# Patient Record
Sex: FEMALE | Race: WHITE | NOT HISPANIC OR LATINO | Employment: OTHER | URBAN - METROPOLITAN AREA
[De-identification: names, ages, dates, MRNs, and addresses within clinical notes are randomized per-mention and may not be internally consistent; named-entity substitution may affect disease eponyms.]

---

## 2023-02-28 ENCOUNTER — OFFICE VISIT (OUTPATIENT)
Dept: URGENT CARE | Facility: CLINIC | Age: 88
End: 2023-02-28
Payer: MEDICARE

## 2023-02-28 VITALS
DIASTOLIC BLOOD PRESSURE: 79 MMHG | BODY MASS INDEX: 21.79 KG/M2 | HEIGHT: 63 IN | HEART RATE: 68 BPM | WEIGHT: 123 LBS | RESPIRATION RATE: 14 BRPM | TEMPERATURE: 99 F | SYSTOLIC BLOOD PRESSURE: 202 MMHG | OXYGEN SATURATION: 95 %

## 2023-02-28 DIAGNOSIS — I16.9 HYPERTENSIVE CRISIS: Primary | ICD-10-CM

## 2023-02-28 DIAGNOSIS — H61.23 BILATERAL IMPACTED CERUMEN: ICD-10-CM

## 2023-02-28 PROCEDURE — 99213 OFFICE O/P EST LOW 20 MIN: CPT | Mod: S$GLB,,,

## 2023-02-28 PROCEDURE — 1159F MED LIST DOCD IN RCRD: CPT | Mod: CPTII,S$GLB,,

## 2023-02-28 PROCEDURE — 1126F AMNT PAIN NOTED NONE PRSNT: CPT | Mod: CPTII,S$GLB,,

## 2023-02-28 PROCEDURE — 99213 PR OFFICE/OUTPT VISIT, EST, LEVL III, 20-29 MIN: ICD-10-PCS | Mod: S$GLB,,,

## 2023-02-28 PROCEDURE — 1160F RVW MEDS BY RX/DR IN RCRD: CPT | Mod: CPTII,S$GLB,,

## 2023-02-28 PROCEDURE — 1159F PR MEDICATION LIST DOCUMENTED IN MEDICAL RECORD: ICD-10-PCS | Mod: CPTII,S$GLB,,

## 2023-02-28 PROCEDURE — 1126F PR PAIN SEVERITY QUANTIFIED, NO PAIN PRESENT: ICD-10-PCS | Mod: CPTII,S$GLB,,

## 2023-02-28 PROCEDURE — 1160F PR REVIEW ALL MEDS BY PRESCRIBER/CLIN PHARMACIST DOCUMENTED: ICD-10-PCS | Mod: CPTII,S$GLB,,

## 2023-02-28 RX ORDER — PROPRANOLOL HYDROCHLORIDE 60 MG/1
60 CAPSULE, EXTENDED RELEASE ORAL
COMMUNITY
Start: 2022-12-10

## 2023-02-28 NOTE — PATIENT INSTRUCTIONS
Make an urgent appointment with your primary care doctor to talk about your blood pressure.     If you develop any new chest pain, shortness of breath, dizziness, vision changes, back pain, or headache go to the ER immediately.    Elevated Blood Pressure Reading  Please be aware your blood pressure was slightly elevated today -  Make sure to take your blood pressure medicines, eat a low salt diet and recheck your blood pressure to make sure it is not getting too elevated ( greater than 160/100).  Also make sure to let your doctor know about the elevated reading.  You should recheck your blood pressure twice a day for the next 2 weeks while follow up with your PCP at your next visit regarding today's reading.  If you have any chest pain, shortness or breath, palpitations, headache, visual disturbances, ect, you should go to the ER.    In the meantime, we recommend you schedule an appointment with your PCP in the next 2-3 days for a recheck of your blood pressure.     - Here are a few generalized recommended lifestyle modifications proven to lower BPs--DASH diet, exercise, weight loss, reducing stress.  *Of note: above recommendations are generalized conservative lifestyle modifications that include but are not limited to above list.   Please do not attempt any of the above recommendations if they do not pertain to you or should cause overall detriment to your health.   Please call the clinic or your PCP with any questions you may have in regards to BP and lifestyle modifications.    Follow these steps when taking your blood pressure to ensure an accurate reading.    1. Be still - ensure at least 5 minutes of quiet rest before measurements. Do not talk or listen while taking the reading.  2. Don't smoke, drink caffeinated beverages or exercise within 30 minutes before measuring your blood pressure. Empty your bladder.  3. Sit correctly - sit with your back straight and supported (on a dining chair, rather than a  sofa). Your feet should be flat on the floor and your legs should not be crossed. Your arm should be supported on a flat surface (such as a table) with the upper arm at heart level. Make sure the bottom of the cuff is placed directly above the bend of the elbow.   4. Measure at the different times of the day, at least 45 minutes after your medications. It is best to take the readings daily.  5. Dont take the measurement over clothes.        - You must understand that you have received an Urgent Care treatment only and that you may be released before all of your medical problems are known or treated.   - You, the patient, will arrange for follow up care as instructed with your primary care provider or recommended specialist.   - If your condition worsens or fails to improve we recommend that you receive another evaluation at the ER immediately or contact your PCP to discuss your concerns, or return here.   - Please do not drive or make any important decisions for 24 hours if you have received any pain medications, sedatives or mood altering drugs during your visit.    Disclaimer: This document was drafted with the use of a voice recognition device and is likely to have sound alike errors.

## 2023-02-28 NOTE — PROGRESS NOTES
"Subjective:       Patient ID: Saida Richard is a 89 y.o. female.    Vitals:  height is 5' 3" (1.6 m) and weight is 55.8 kg (123 lb). Her oral temperature is 98.5 °F (36.9 °C). Her blood pressure is 202/79 (abnormal) and her pulse is 68. Her respiration is 14 and oxygen saturation is 95%.     Chief Complaint: Ear Fullness    Patient has a referral for hearing aides. Patient told her ears were clogged by audiologist and told she needed to get them cleaned before getting hearing aids.    Of note patient's BP is elevated today in clinic. Based on recent visits, her systolic BP is always elevated. She took her beta blocker this morning. Denies any chest pain, shortness of breath, dizziness, n/v, weakness, increased trouble speaking. Unable to notify PCP due to office closed and not available on secure chat. NKDA.    Ear Fullness   There is pain in both ears. This is a new problem. The current episode started 1 to 4 weeks ago. The problem occurs constantly. The problem has been gradually worsening. There has been no fever. The pain is at a severity of 0/10. The patient is experiencing no pain. Associated symptoms include hearing loss. Pertinent negatives include no abdominal pain, coughing, diarrhea, ear discharge, headaches, neck pain, rash, rhinorrhea, sore throat or vomiting. She has tried nothing for the symptoms. Her past medical history is significant for hearing loss. There is no history of a chronic ear infection or a tympanostomy tube.   Constitution: Negative for chills and fever.   HENT:  Positive for hearing loss. Negative for ear pain, ear discharge, congestion, sinus pain, sinus pressure, sore throat, trouble swallowing and voice change.    Neck: Negative for neck pain.   Cardiovascular:  Negative for chest pain.   Eyes:  Negative for eye discharge, eye pain, photophobia, vision loss, double vision and blurred vision.   Respiratory:  Negative for cough and shortness of breath.    Gastrointestinal:  Negative " for abdominal pain, vomiting and diarrhea.   Genitourinary:  Negative for flank pain.   Musculoskeletal:  Negative for back pain.   Skin:  Negative for rash.   Neurological:  Negative for dizziness, light-headedness, passing out, headaches, disorientation, loss of consciousness, numbness and tingling.   Psychiatric/Behavioral:  Negative for disorientation and confusion.      Objective:      Vitals:    02/28/23 1621   BP: (!) 202/79   Pulse: 68   Resp: 14   Temp: 98.5 °F (36.9 °C)       Physical Exam   Constitutional: She is oriented to person, place, and time. She appears well-developed. She is cooperative.  Non-toxic appearance. She does not appear ill. No distress.   HENT:   Head: Normocephalic and atraumatic.   Ears:   Right Ear: Hearing, tympanic membrane, external ear and ear canal normal. impacted cerumen  Left Ear: Hearing, tympanic membrane, external ear and ear canal normal. impacted cerumen  Nose: Nose normal. No mucosal edema, rhinorrhea or nasal deformity. No epistaxis. Right sinus exhibits no maxillary sinus tenderness and no frontal sinus tenderness. Left sinus exhibits no maxillary sinus tenderness and no frontal sinus tenderness.   Mouth/Throat: Uvula is midline, oropharynx is clear and moist and mucous membranes are normal. Mucous membranes are moist. No trismus in the jaw. Normal dentition. No uvula swelling. No oropharyngeal exudate, posterior oropharyngeal edema or posterior oropharyngeal erythema.   Bilateral ear wax removal completed in clinic. Rechecked ears. No signs of AOM, effusion or pain.       Comments: Bilateral ear wax removal completed in clinic. Rechecked ears. No signs of AOM, effusion or pain.   Eyes: Conjunctivae and lids are normal. Right eye exhibits no discharge. Left eye exhibits no discharge. No scleral icterus.   Neck: Trachea normal and phonation normal. Neck supple. No edema present. No erythema present. No neck rigidity present.   Cardiovascular: Normal rate, regular  rhythm, normal heart sounds and normal pulses.   Pulmonary/Chest: Effort normal and breath sounds normal. No respiratory distress. She has no decreased breath sounds. She has no wheezes. She has no rhonchi. She has no rales.   Abdominal: Normal appearance.   Musculoskeletal: Normal range of motion.         General: No deformity. Normal range of motion.   Neurological: She is alert and oriented to person, place, and time. She displays no weakness. She exhibits normal muscle tone. Coordination and gait (ambulatory with walking assistance) normal.   Skin: Skin is warm, dry, intact, not diaphoretic and not pale.   Psychiatric: Her speech is normal and behavior is normal. Judgment and thought content normal.   Nursing note and vitals reviewed.      Assessment:       1. Hypertensive crisis    2. Bilateral impacted cerumen          Plan:         Hypertensive crisis    Bilateral impacted cerumen  -     Ear wax removal         Patient Instructions   Make an urgent appointment with your primary care doctor to talk about your blood pressure.     If you develop any new chest pain, shortness of breath, dizziness, vision changes, back pain, or headache go to the ER immediately.    Elevated Blood Pressure Reading  Please be aware your blood pressure was slightly elevated today -  Make sure to take your blood pressure medicines, eat a low salt diet and recheck your blood pressure to make sure it is not getting too elevated ( greater than 160/100).  Also make sure to let your doctor know about the elevated reading.  You should recheck your blood pressure twice a day for the next 2 weeks while follow up with your PCP at your next visit regarding today's reading.  If you have any chest pain, shortness or breath, palpitations, headache, visual disturbances, ect, you should go to the ER.    In the meantime, we recommend you schedule an appointment with your PCP in the next 2-3 days for a recheck of your blood pressure.     - Here are a  few generalized recommended lifestyle modifications proven to lower BPs--DASH diet, exercise, weight loss, reducing stress.  *Of note: above recommendations are generalized conservative lifestyle modifications that include but are not limited to above list.   Please do not attempt any of the above recommendations if they do not pertain to you or should cause overall detriment to your health.   Please call the clinic or your PCP with any questions you may have in regards to BP and lifestyle modifications.    Follow these steps when taking your blood pressure to ensure an accurate reading.    1. Be still - ensure at least 5 minutes of quiet rest before measurements. Do not talk or listen while taking the reading.  2. Don't smoke, drink caffeinated beverages or exercise within 30 minutes before measuring your blood pressure. Empty your bladder.  3. Sit correctly - sit with your back straight and supported (on a dining chair, rather than a sofa). Your feet should be flat on the floor and your legs should not be crossed. Your arm should be supported on a flat surface (such as a table) with the upper arm at heart level. Make sure the bottom of the cuff is placed directly above the bend of the elbow.   4. Measure at the different times of the day, at least 45 minutes after your medications. It is best to take the readings daily.  5. Dont take the measurement over clothes.        - You must understand that you have received an Urgent Care treatment only and that you may be released before all of your medical problems are known or treated.   - You, the patient, will arrange for follow up care as instructed with your primary care provider or recommended specialist.   - If your condition worsens or fails to improve we recommend that you receive another evaluation at the ER immediately or contact your PCP to discuss your concerns, or return here.   - Please do not drive or make any important decisions for 24 hours if you have  received any pain medications, sedatives or mood altering drugs during your visit.    Disclaimer: This document was drafted with the use of a voice recognition device and is likely to have sound alike errors.         Medical Decision Making:   History:   Old Medical Records: I decided to obtain old medical records.  Urgent Care Management:  Rechecked manual BP - still elevated. Checked previous notes and systolic BP is constantly elevated.   Bilateral ear irrigation  Called [facility] to set up appointment ASAP with a PCP who is available

## 2023-03-01 DIAGNOSIS — I16.9 HYPERTENSIVE CRISIS: Primary | ICD-10-CM

## 2023-05-11 ENCOUNTER — OFFICE VISIT (OUTPATIENT)
Dept: URGENT CARE | Facility: CLINIC | Age: 88
End: 2023-05-11
Payer: MEDICARE

## 2023-05-11 VITALS
DIASTOLIC BLOOD PRESSURE: 78 MMHG | WEIGHT: 123 LBS | TEMPERATURE: 98 F | SYSTOLIC BLOOD PRESSURE: 110 MMHG | BODY MASS INDEX: 21.79 KG/M2 | OXYGEN SATURATION: 95 % | HEIGHT: 63 IN | HEART RATE: 87 BPM | RESPIRATION RATE: 18 BRPM

## 2023-05-11 DIAGNOSIS — S60.512A ABRASION OF LEFT HAND, INITIAL ENCOUNTER: Primary | ICD-10-CM

## 2023-05-11 DIAGNOSIS — S50.312A ABRASION OF LEFT ELBOW, INITIAL ENCOUNTER: ICD-10-CM

## 2023-05-11 PROCEDURE — 12002 RPR S/N/AX/GEN/TRNK2.6-7.5CM: CPT | Mod: S$GLB,,, | Performed by: PHYSICIAN ASSISTANT

## 2023-05-11 PROCEDURE — 90471 TDAP VACCINE GREATER THAN OR EQUAL TO 7YO IM: ICD-10-PCS | Mod: S$GLB,,, | Performed by: PHYSICIAN ASSISTANT

## 2023-05-11 PROCEDURE — 99213 OFFICE O/P EST LOW 20 MIN: CPT | Mod: 25,S$GLB,, | Performed by: PHYSICIAN ASSISTANT

## 2023-05-11 PROCEDURE — 12002 LACERATION REPAIR: ICD-10-PCS | Mod: S$GLB,,, | Performed by: PHYSICIAN ASSISTANT

## 2023-05-11 PROCEDURE — 99213 PR OFFICE/OUTPT VISIT, EST, LEVL III, 20-29 MIN: ICD-10-PCS | Mod: 25,S$GLB,, | Performed by: PHYSICIAN ASSISTANT

## 2023-05-11 PROCEDURE — 90471 IMMUNIZATION ADMIN: CPT | Mod: S$GLB,,, | Performed by: PHYSICIAN ASSISTANT

## 2023-05-11 PROCEDURE — 90715 TDAP VACCINE 7 YRS/> IM: CPT | Mod: S$GLB,,, | Performed by: PHYSICIAN ASSISTANT

## 2023-05-11 PROCEDURE — 90715 TDAP VACCINE GREATER THAN OR EQUAL TO 7YO IM: ICD-10-PCS | Mod: S$GLB,,, | Performed by: PHYSICIAN ASSISTANT

## 2023-05-11 RX ORDER — ERYTHROMYCIN 5 MG/G
OINTMENT OPHTHALMIC 2 TIMES DAILY
COMMUNITY
Start: 2023-03-02

## 2023-05-11 RX ORDER — DOXYCYCLINE HYCLATE 100 MG
100 TABLET ORAL 2 TIMES DAILY
Qty: 14 TABLET | Refills: 0 | Status: SHIPPED | OUTPATIENT
Start: 2023-05-11 | End: 2023-05-18

## 2023-05-11 RX ORDER — MUPIROCIN 20 MG/G
OINTMENT TOPICAL
Status: COMPLETED | OUTPATIENT
Start: 2023-05-11 | End: 2023-05-11

## 2023-05-11 RX ADMIN — MUPIROCIN: 20 OINTMENT TOPICAL at 10:05

## 2023-05-11 NOTE — PROCEDURES
"Laceration Repair    Date/Time: 2023 9:45 AM  Performed by: Yolanda Villa PA-C  Authorized by: Yolanda Villa PA-C   Consent Done: Yes  Consent: Verbal consent obtained.  Risks and benefits: risks, benefits and alternatives were discussed  Consent given by: patient  Patient understanding: patient states understanding of the procedure being performed  Required items: required blood products, implants, devices, and special equipment available  Patient identity confirmed:  and name  Time out: Immediately prior to procedure a "time out" was called to verify the correct patient, procedure, equipment, support staff and site/side marked as required.  Body area: upper extremity  Location details: left hand  Laceration length: 6 cm  Foreign bodies: no foreign bodies  Tendon involvement: none  Nerve involvement: none  Vascular damage: no  Anesthesia: see MAR for details (none)    Anesthesia:  Anesthetic total: 0 mL    Patient sedated: no  Preparation: Patient was prepped and draped in the usual sterile fashion.  Irrigation solution: saline  Irrigation method: syringe  Amount of cleaning: standard  Debridement: none  Degree of undermining: none  Wound skin closure material used: dermabond.  Approximation: close  Approximation difficulty: simple  Dressing: non-stick sterile dressing  Patient tolerance: Patient tolerated the procedure well with no immediate complications      "

## 2023-05-11 NOTE — PROGRESS NOTES
"Subjective:      Patient ID: Saida Richard is a 89 y.o. female.    Vitals:  height is 5' 3" (1.6 m) and weight is 55.8 kg (123 lb). Her oral temperature is 98 °F (36.7 °C). Her blood pressure is 110/78 and her pulse is 87. Her respiration is 18 and oxygen saturation is 95%.     Chief Complaint: Abrasion (Left wrist left elbow)    This is a 89 y.o. female who presents today with a chief complaint of  left wrist and elbow abrasion today. Pt state she fell out her bed and hit her trashcan. She denies head trauma or LOC.     Fall  The accident occurred 1 to 3 hours ago. The fall occurred from a bed. She fell from a height of 1 to 2 ft. She landed on Hard floor. The volume of blood lost was minimal. The point of impact was the left wrist and left elbow. The pain is present in the left wrist, left lower arm and left elbow. The pain is at a severity of 4/10. The pain is moderate. The symptoms are aggravated by ambulation and movement. Pertinent negatives include no abdominal pain, bowel incontinence, fever, headaches, hearing loss, loss of consciousness, nausea, numbness, tingling, visual change or vomiting. She has tried nothing for the symptoms. The treatment provided no relief.     Constitution: Negative for chills, sweating, fatigue and fever.   Neck: Negative for neck pain and neck stiffness.   Cardiovascular:  Negative for chest trauma, chest pain and sob on exertion.   Respiratory:  Negative for cough and shortness of breath.    Gastrointestinal:  Negative for abdominal trauma, abdominal pain, nausea, vomiting, constipation, diarrhea and bowel incontinence.   Musculoskeletal:  Positive for pain, trauma and muscle ache. Negative for joint pain, joint swelling, abnormal ROM of joint, back pain and muscle cramps.   Skin:  Positive for abrasion.   Neurological:  Negative for headaches, altered mental status, loss of consciousness, numbness, tingling and tremors.   Psychiatric/Behavioral:  Negative for altered mental " status.    Past Medical History:   Diagnosis Date    CA - breast cancer     Hypertension     Pollen allergies     Spasmodic dysphonia     with vocal tremors    Thyroid disease        Past Surgical History:   Procedure Laterality Date    HERNIA REPAIR         History reviewed. No pertinent family history.    Social History     Socioeconomic History    Marital status:    Tobacco Use    Smoking status: Never    Smokeless tobacco: Never   Substance and Sexual Activity    Alcohol use: No    Drug use: No    Sexual activity: Never       Current Outpatient Medications   Medication Sig Dispense Refill    erythromycin (ROMYCIN) ophthalmic ointment Place into both eyes 2 (two) times daily.      levothyroxine (SYNTHROID) 100 MCG tablet Take 100 mcg by mouth once daily.      propranoloL (INDERAL LA) 60 MG 24 hr capsule Take 60 mg by mouth.      doxycycline (VIBRA-TABS) 100 MG tablet Take 1 tablet (100 mg total) by mouth 2 (two) times daily. for 7 days 14 tablet 0    famotidine (PEPCID) 20 MG tablet Take 1 tablet (20 mg total) by mouth 2 (two) times daily. 84 tablet 1     Current Facility-Administered Medications   Medication Dose Route Frequency Provider Last Rate Last Admin    mupirocin 2 % ointment   Topical (Top) 1 time in Clinic/HOD Yolanda Villa PA-C           Review of patient's allergies indicates:  No Known Allergies     Objective:     Physical Exam   Constitutional: She is oriented to person, place, and time.  Non-toxic appearance. She does not appear ill. No distress. normal  HENT:   Head: Normocephalic and atraumatic.   Ears:   Right Ear: External ear normal.   Left Ear: External ear normal.   Nose: Nose normal.   Eyes: Conjunctivae are normal.   Neck: Neck supple.   Cardiovascular: Normal rate, regular rhythm and normal pulses.   Pulmonary/Chest: Effort normal. No respiratory distress.   Abdominal: Normal appearance.   Musculoskeletal: Normal range of motion.         General: Swelling and signs of injury  present. No tenderness. Normal range of motion.      Left shoulder: Normal.      Right elbow: Normal.     Left elbow: Normal.      Right wrist: Normal.      Left wrist: Normal.      Right upper arm: Normal.      Left upper arm: Normal.      Right forearm: Normal.      Left forearm: Normal.      Right hand: Normal.      Left hand: Normal.   Neurological: no focal deficit. She is alert, oriented to person, place, and time and at baseline. She displays no weakness. No sensory deficit. Coordination normal.   Skin: Skin is warm, dry, not diaphoretic and not pale. Capillary refill takes less than 2 seconds. Abrasions - upper ext.:  elbow (left) and hand (left)  bruising   Psychiatric: Her behavior is normal. Mood, judgment and thought content normal.   Nursing note and vitals reviewed.              Assessment:     1. Abrasion of left hand, initial encounter    2. Abrasion of left elbow, initial encounter        Plan:       Abrasion of left hand, initial encounter  -     (In Office Administered) Tdap Vaccine  -     doxycycline (VIBRA-TABS) 100 MG tablet; Take 1 tablet (100 mg total) by mouth 2 (two) times daily. for 7 days  Dispense: 14 tablet; Refill: 0  -     Laceration Repair    Abrasion of left elbow, initial encounter  -     mupirocin 2 % ointment  -     doxycycline (VIBRA-TABS) 100 MG tablet; Take 1 tablet (100 mg total) by mouth 2 (two) times daily. for 7 days  Dispense: 14 tablet; Refill: 0    -tylenol as needed for pain  Patient Instructions   The wound is cleansed, debrided of foreign material as much as possible, and dressed. Be alert for any signs of infection (redness, pus, pain, increased swelling or fever) and call if such occurs. Keep the wound clean and dry. You may remove the bandage to shower as usual after the first 24 hours to prevent crusting. Do not soak the area in water (no tubs or swimming) until the sutures are removed. After removing the bandage, wash the area with soap and water at least twice  daily unless more frequently soiled, then clean more often. Clean old antibiotic ointment away. After 48 hours, then leave open to air and do not bandage. Do not use neosporin/polysporin. You were prescribed doxycycline for a antimicrobial. Take with food no milk products, avoid sun exposure you are more prone to sun burns while on this medication.  If sutures or staples are in place, it is important to keep your appointment for removal. Dermabond (skin glue) was used do not place any topical antibiotics, lotions, creams or vasoline. The dermabond will fall off on its own in 7-10 days. Do not pull off. The glue keeps bacteria out of the wound. All wounds will leave a scar. To prevent the scar from bleaching in the sun make sure to wear sunscreen in the sun after the wound has healed. Do not apply a great deal of tension or stress to the suture line. Tetanus vaccination status reviewed: Tdap vaccination indicated and given today.          Medical Decision Making:   Initial Assessment:   Abrasion left elbow and left hand  Differential Diagnosis:   Laceration, fracture, muscle strain  Urgent Care Management:  See above

## 2023-05-11 NOTE — PATIENT INSTRUCTIONS
The wound is cleansed, debrided of foreign material as much as possible, and dressed. Be alert for any signs of infection (redness, pus, pain, increased swelling or fever) and call if such occurs. Keep the wound clean and dry. You may remove the bandage to shower as usual after the first 24 hours to prevent crusting. Do not soak the area in water (no tubs or swimming) until the sutures are removed. After removing the bandage, wash the area with soap and water at least twice daily unless more frequently soiled, then clean more often. Clean old antibiotic ointment away. After 48 hours, then leave open to air and do not bandage. Do not use neosporin/polysporin. You were prescribed doxycycline for a antimicrobial. Take with food no milk products, avoid sun exposure you are more prone to sun burns while on this medication.  If sutures or staples are in place, it is important to keep your appointment for removal. Dermabond (skin glue) was used do not place any topical antibiotics, lotions, creams or vasoline. The dermabond will fall off on its own in 7-10 days. Do not pull off. The glue keeps bacteria out of the wound. All wounds will leave a scar. To prevent the scar from bleaching in the sun make sure to wear sunscreen in the sun after the wound has healed. Do not apply a great deal of tension or stress to the suture line. Tetanus vaccination status reviewed: Tdap vaccination indicated and given today.

## 2023-08-02 ENCOUNTER — OFFICE VISIT (OUTPATIENT)
Dept: OTOLARYNGOLOGY | Facility: CLINIC | Age: 88
End: 2023-08-02
Payer: MEDICARE

## 2023-08-02 VITALS — WEIGHT: 123 LBS | BODY MASS INDEX: 21.79 KG/M2

## 2023-08-02 DIAGNOSIS — R49.0 DYSPHONIA: Primary | ICD-10-CM

## 2023-08-02 DIAGNOSIS — J38.5 LARYNGEAL SPASM: ICD-10-CM

## 2023-08-02 DIAGNOSIS — R49.8 VOCAL TREMOR: ICD-10-CM

## 2023-08-02 DIAGNOSIS — J38.3 ADDUCTOR SPASMODIC DYSPHONIA: Primary | ICD-10-CM

## 2023-08-02 DIAGNOSIS — J38.3 ADDUCTOR SPASMODIC DYSPHONIA: ICD-10-CM

## 2023-08-02 PROCEDURE — 1126F PR PAIN SEVERITY QUANTIFIED, NO PAIN PRESENT: ICD-10-PCS | Mod: CPTII,S$GLB,, | Performed by: OTOLARYNGOLOGY

## 2023-08-02 PROCEDURE — 1160F RVW MEDS BY RX/DR IN RCRD: CPT | Mod: CPTII,S$GLB,, | Performed by: OTOLARYNGOLOGY

## 2023-08-02 PROCEDURE — 31579 LARYNGOSCOPY TELESCOPIC: CPT | Mod: S$GLB,,, | Performed by: OTOLARYNGOLOGY

## 2023-08-02 PROCEDURE — 99999 PR PBB SHADOW E&M-EST. PATIENT-LVL III: CPT | Mod: PBBFAC,,, | Performed by: OTOLARYNGOLOGY

## 2023-08-02 PROCEDURE — 99999 PR PBB SHADOW E&M-EST. PATIENT-LVL III: ICD-10-PCS | Mod: PBBFAC,,, | Performed by: OTOLARYNGOLOGY

## 2023-08-02 PROCEDURE — 1159F MED LIST DOCD IN RCRD: CPT | Mod: CPTII,S$GLB,, | Performed by: OTOLARYNGOLOGY

## 2023-08-02 PROCEDURE — 1159F PR MEDICATION LIST DOCUMENTED IN MEDICAL RECORD: ICD-10-PCS | Mod: CPTII,S$GLB,, | Performed by: OTOLARYNGOLOGY

## 2023-08-02 PROCEDURE — 99204 OFFICE O/P NEW MOD 45 MIN: CPT | Mod: 25,S$GLB,, | Performed by: OTOLARYNGOLOGY

## 2023-08-02 PROCEDURE — 1160F PR REVIEW ALL MEDS BY PRESCRIBER/CLIN PHARMACIST DOCUMENTED: ICD-10-PCS | Mod: CPTII,S$GLB,, | Performed by: OTOLARYNGOLOGY

## 2023-08-02 PROCEDURE — 99204 PR OFFICE/OUTPT VISIT, NEW, LEVL IV, 45-59 MIN: ICD-10-PCS | Mod: 25,S$GLB,, | Performed by: OTOLARYNGOLOGY

## 2023-08-02 PROCEDURE — 31579 PR LARYNGOSCOPY, FLEX/RIGID TELESCOPIC, W/STROBOSCOPY: ICD-10-PCS | Mod: S$GLB,,, | Performed by: OTOLARYNGOLOGY

## 2023-08-02 PROCEDURE — 1126F AMNT PAIN NOTED NONE PRSNT: CPT | Mod: CPTII,S$GLB,, | Performed by: OTOLARYNGOLOGY

## 2023-08-02 NOTE — PROGRESS NOTES
"[facility]  Department of Otorhinolaryngology and Communication Sciences    Saida Richard is a 89 y.o. female who [facility] for consultation at the kind request of Dr. Omid Pringle for further management of  spasmodic dysphonia .     She complains of difficulty with her voice--increased vocal effort, breaks in the voice, and difficulty communicating with others. Onset was gradual. Duration is 25-30 years. Time course is constant. Symptoms are  gradually worsening , although it is somewhat variable. Exacerbating factors include being under stress. Alleviating factors include laryngeal botulinum toxin injections.  Her father had a diagnosis of SD also.     She for many years was being cared for by Dr. Pringle, with very good results, but she had increasing difficulty getting up to [place] for treatments.    Her last injection was in 2018. She typically has a "feathery" voice for about 1 week afterwards, no dysphagia. Thereafter, to her recollection, her voice was pretty close to normal, sometimes for close to 1 year.    She recalls a remote history of seeing a neurologist for torticollis and undergoing botulinum toxin injections to her cervical musculature before [place].    Past Medical History  She has a past medical history of CA - breast cancer, Hypertension, Pollen allergies, Spasmodic dysphonia, and Thyroid disease.    Past Surgical History  She has a past surgical history that includes Hernia repair.    Family History  Her family history is not on file.    Social History  She reports that she has never smoked. She has never used smokeless tobacco. She reports that she does not drink alcohol and does not use drugs.    Allergies  She has No Known Allergies.    Medications  She has a current medication list which includes the following prescription(s): erythromycin, famotidine, levothyroxine, and propranolol.    Review of Systems   Constitutional:  Negative for fever. "   HENT:  Negative for sore throat.    Eyes:  Negative for visual disturbance.   Respiratory:  Negative for wheezing.    Cardiovascular:  Negative for chest pain.   Gastrointestinal:  Negative for nausea.   Musculoskeletal:  Negative for arthralgias.   Skin:  Negative for rash.   Neurological:  Negative for tremors.   Hematological:  Does not bruise/bleed easily.   Psychiatric/Behavioral:  The patient is not nervous/anxious.           Objective:     VS reviewed  Physical Exam  Constitutional: comfortable, well dressed  Psychiatric: appropriate affect  Respiratory: comfortably breathing, symmetric chest rise, no stridor  Voice: severe strain with breaks primarily on voiced phonemes; tremor on sustained vowel phonation  Cardiovascular: upper extremities non-edematous  Lymphatic: no cervical lymphadenopathy  Neurologic: alert and oriented to time, place, person, and situation; cranial nerves 3-12 grossly intact; + cervicofacial dystonia noted, primarily activated by speech    Head: normocephalic  Eyes: conjunctivae and sclerae clear  Ears: normal pinnae, normal external auditory canals, tympanic membranes intact  Nose: mucosa pink and noncongested, no masses, no mucopurulence, no polyps  Oral cavity / oropharynx: no mucosal lesions  Neck: soft, full range of motion, laryngotracheal complex palpable with appropriate landmarks, larynx elevates on swallowing  Indirect laryngoscopy: limited due to gag    Procedure  Flexible Laryngeal Videostroboscopy (80263): Laryngeal videostroboscopy is indicated to assess laryngeal vibratory biomechanics and vocal fold oscillation, which cannot be assessed with a plain light examination. This was carried out transnasally with a distal chip videoendoscope. After verbal consent was obtained, the patient was positioned and the nose was topically decongested with 1% phenylephrine and topically anesthetized with 4% lidocaine. The endoscope was passed through the most patent nasal cavity and  positioned to image the nasopharynx, larynx, and hypopharynx in detail. The following features were examined: nasopharyngeal, laryngeal, hypopharyngeal masses; velopharyngeal strength, closure, and symmetry of motion; vocal fold range and symmetry of motion; laryngeal mucosal edema, erythema, inflammation, and hydration; salivary pooling; and gross laryngeal sensation. During phonation, the vocal folds were assessed for glottal closure; mucosal wave; vocal fold lesions; vibratory periodicity, amplitude, and phase symmetry; and vertical height match. The equipment was removed. The patient tolerated the procedure well without complication. All findings were normal except:  - mild bilateral vocal fold bowing  - variable posterior overclosure resulting in truncated phonatory segment and cessation of vibration  - hyperkinetic laryngeal behavior noted during vegetative respiration      Assessment:     Saida Richard is a 89 y.o. 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     Plan:        I had a discussion with the patient and her son  regarding her condition and the further workup and management options.      The risks of laryngeal botulinum toxin chemodenervation were discussed at length with the patient. Risks included but were not limited to pain, bleeding, infection, worsening of voice, worsening of swallowing, dysphagia, aspiration, shortness of breath, noisy breathing, airway obstruction, need for additional injections or procedures, reaction to medication, and development of tolerance to the medication. Our careful and patient-centered approach will try to minimize side effects while also treating the dystonia/spasms/tremors. We emphasized to the patient that sometimes it takes a few injections to determine what dosing strategy works best for each patient. All questions were answered, and the patient wishes to proceed.     All questions  were answered, and the patient is in agreement with the above.     Raghu Nazario M.D.  [facility]  Department of Otorhinolaryngology and Communication Sciences

## 2023-08-02 NOTE — PATIENT INSTRUCTIONS
What to expect:  Botox for adductor spasmodic dysphonia    The purpose of the Botox injection into the larynx is to cause a temporary weakening of the vocal fold muscles in order to reduce the voice spasms.  It takes several days to begin working, but after the first few days you will notice a decrease in the spasms.  Every injection may be slightly different, so it is important for you to keep track of your voice and swallowing status on the log sheet provided.  Please return the log each time you come in for an injection.      Basic Botox guidelines  Within 24-72 hours, you should expect your voice to become weak and breathy.  After one week, call into the [facility] Center at [phone] and leave a message so our team can hear your voice and  the effectiveness of the injection.   We will not call you back unless you ask us to do so.      After 1-2 weeks, your voice should start to get stronger and have fewer spasms.  The stronger voice typically lasts for about three months, but there is some variability.    You may experience some difficulties with swallowing for the first week after your injection, but these should be short-lived.  Take extra care when drinking thin liquids such as water, coffee, juice, soda, and tea.  If you do find yourself coughing frequently with liquids, try tucking your chin while you swallow, or thickening your liquids.  Thick-It, a liquid thickener, can be purchased at most pharmacies.      Helpful resources:  http://www.dysphonia.org   http://www.roderick.org  Http://www.entlink.net    If you have any questions, please call our office at [phone], or contact us through the [facility] portal.

## 2023-08-02 NOTE — Clinical Note
Can you please see if we can get the record of her last botox injection with Dr. Pringle? It was maybe in 2018. We are primarily looking to see what dose they were using at the time of her last injection. Thanks.

## 2023-09-01 ENCOUNTER — PROCEDURE VISIT (OUTPATIENT)
Dept: OTOLARYNGOLOGY | Facility: CLINIC | Age: 88
End: 2023-09-01
Payer: MEDICARE

## 2023-09-01 VITALS — DIASTOLIC BLOOD PRESSURE: 77 MMHG | HEART RATE: 72 BPM | SYSTOLIC BLOOD PRESSURE: 150 MMHG

## 2023-09-01 DIAGNOSIS — J38.5 LARYNGEAL SPASM: ICD-10-CM

## 2023-09-01 DIAGNOSIS — J38.3 ADDUCTOR SPASMODIC DYSPHONIA: Primary | ICD-10-CM

## 2023-09-01 DIAGNOSIS — R49.0 DYSPHONIA: ICD-10-CM

## 2023-09-01 DIAGNOSIS — R49.8 VOCAL TREMOR: ICD-10-CM

## 2023-09-01 PROCEDURE — 64617 CHEMODENER MUSCLE LARYNX EMG: CPT | Mod: 50,S$GLB,, | Performed by: OTOLARYNGOLOGY

## 2023-09-01 PROCEDURE — 64617 PR CHEMODENERVATION LARYNX, UNI, PERCU EMG: ICD-10-PCS | Mod: 50,S$GLB,, | Performed by: OTOLARYNGOLOGY

## 2023-09-01 NOTE — PATIENT INSTRUCTIONS
What to expect:  Botox for adductor spasmodic dysphonia    The purpose of the Botox injection into the larynx is to cause a temporary weakening of the vocal fold muscles in order to reduce the voice spasms.  It takes several days to begin working, but after the first few days you will notice a decrease in the spasms.  Every injection may be slightly different, so it is important for you to keep track of your voice and swallowing status on the log sheet provided.  Please return the log each time you come in for an injection.      Basic Botox guidelines  Within 24-72 hours, you should expect your voice to become weak and breathy.  After one week, call into the [facility] Center at [phone] and leave a message so our team can hear your voice and  the effectiveness of the injection.   We will not call you back unless you ask us to do so.      After 1-2 weeks, your voice should start to get stronger and have fewer spasms.  The stronger voice typically lasts for about three months, but there is some variability.    You may experience some difficulties with swallowing for the first week after your injection, but these should be short-lived.  Take extra care when drinking thin liquids such as water, coffee, juice, soda, and tea.  If you do find yourself coughing frequently with liquids, try tucking your chin while you swallow, or thickening your liquids.  Thick-It, a liquid thickener, can be purchased at most pharmacies.      Helpful resources:  http://www.dysphonia.org   http://www.roderick.org  Http://www.entlink.net    If you have any questions, please call our office at [phone], or contact us through the [facility] portal.

## 2023-09-01 NOTE — PROCEDURES
Procedures  [facility]  Department of Otorhinolaryngology and Communication Sciences    Preoperative Diagnosis:   1. Adductor spasmodic dypshonia with tremor.  2. Laryngeal spasm.    Postoperative Diagnosis:   1. Adductor spasmodic dypshonia with tremor.  2. Laryngeal spasm.    Procedure: Chemodenervation of larynx, percutaneous, under guidance of needle electromyography, bilateral thyroarytenoid/lateral cricoarytenoid complex.    Toxin serotype used: Botox.    Total units employed:   1. Left TA/LCA - 2.5 units.  2. Right TA/LCA - 2.5 units.  3.  45 units wasted.    Surgeon: Raghu Nazario M.D.     Estimated blood loss: None.    Anesthesia: Local.     Complications: None.    Procedure in detail: Saida Richard was positively identified with two identifiers and was seated upright in a comfortable position. Final time-out was performed for verification purposes. Local cutaneous and subcutaneous anesthesia was not necessary. Surface electrodes were connected. Botulinum toxin was reconstituted with sterile normal saline at a concentration of 25 units/mL. A 1 mL tuberculin syringe pre-loaded with botulinum toxin was connected to a 37 mm 26-gauge injectable needle electrode. The needle was advanced percutaneously into the targeted muscle groups. Appropriate insertional activity and recruitment were noted based on visual and auditory feedback of electromyography. Under electromyographic guidance, botulinum toxin was administered, with the quantity of toxin used and muscle groups targeted outlined above. The equipment was removed. The patient tolerated the procedure well without complication. All needle counts were correct at the completion of the procedure. The patient was observed briefly before being discharged home in good condition.    Raghu Nazario M.D.  [facility]  Department of Otorhinolaryngology and Communication Sciences

## 2023-10-04 ENCOUNTER — OFFICE VISIT (OUTPATIENT)
Dept: OTOLARYNGOLOGY | Facility: CLINIC | Age: 88
End: 2023-10-04
Payer: MEDICARE

## 2023-10-04 VITALS — SYSTOLIC BLOOD PRESSURE: 175 MMHG | HEART RATE: 78 BPM | DIASTOLIC BLOOD PRESSURE: 78 MMHG

## 2023-10-04 DIAGNOSIS — J38.5 LARYNGEAL SPASM: ICD-10-CM

## 2023-10-04 DIAGNOSIS — J38.3 ADDUCTOR SPASMODIC DYSPHONIA: Primary | ICD-10-CM

## 2023-10-04 DIAGNOSIS — R49.8 VOCAL TREMOR: ICD-10-CM

## 2023-10-04 PROCEDURE — 99213 OFFICE O/P EST LOW 20 MIN: CPT | Mod: S$GLB,,, | Performed by: OTOLARYNGOLOGY

## 2023-10-04 PROCEDURE — 1159F MED LIST DOCD IN RCRD: CPT | Mod: CPTII,S$GLB,, | Performed by: OTOLARYNGOLOGY

## 2023-10-04 PROCEDURE — 99999 PR PBB SHADOW E&M-EST. PATIENT-LVL III: CPT | Mod: PBBFAC,,, | Performed by: OTOLARYNGOLOGY

## 2023-10-04 PROCEDURE — 1160F PR REVIEW ALL MEDS BY PRESCRIBER/CLIN PHARMACIST DOCUMENTED: ICD-10-PCS | Mod: CPTII,S$GLB,, | Performed by: OTOLARYNGOLOGY

## 2023-10-04 PROCEDURE — 99213 PR OFFICE/OUTPT VISIT, EST, LEVL III, 20-29 MIN: ICD-10-PCS | Mod: S$GLB,,, | Performed by: OTOLARYNGOLOGY

## 2023-10-04 PROCEDURE — 1126F PR PAIN SEVERITY QUANTIFIED, NO PAIN PRESENT: ICD-10-PCS | Mod: CPTII,S$GLB,, | Performed by: OTOLARYNGOLOGY

## 2023-10-04 PROCEDURE — 1160F RVW MEDS BY RX/DR IN RCRD: CPT | Mod: CPTII,S$GLB,, | Performed by: OTOLARYNGOLOGY

## 2023-10-04 PROCEDURE — 1159F PR MEDICATION LIST DOCUMENTED IN MEDICAL RECORD: ICD-10-PCS | Mod: CPTII,S$GLB,, | Performed by: OTOLARYNGOLOGY

## 2023-10-04 PROCEDURE — 1126F AMNT PAIN NOTED NONE PRSNT: CPT | Mod: CPTII,S$GLB,, | Performed by: OTOLARYNGOLOGY

## 2023-10-04 PROCEDURE — 99999 PR PBB SHADOW E&M-EST. PATIENT-LVL III: ICD-10-PCS | Mod: PBBFAC,,, | Performed by: OTOLARYNGOLOGY

## 2023-10-04 NOTE — PROGRESS NOTES
[facility]  Department of Otorhinolaryngology and Communication Sciences    Subjective:      Saida Richard is a 89 y.o. female who presents for follow-up. She has chronic familial spasmodic dysphonia with tremor, against a background of a segmental/cervicofacial dystonia.    09/01/2023 Botox EMG 2.5 units bilateral TAs    She characterizes breathy voice x 2-3 days but no dysphagia following her recent injection. Within about 1 week, her voice improved to its current level. She is pleased with her progress and her current status. Her family and friends all have reported greater ease of communicating with her.    The patient's medications, allergies, past medical, surgical, social and family histories were reviewed and updated as appropriate.    A detailed review of systems was obtained with pertinent positives as per the above HPI, and otherwise negative.      Objective:     BP (!) 175/78   Pulse 78      Constitutional: comfortable, well dressed  Psychiatric: appropriate affect  Respiratory: comfortably breathing, symmetric chest rise, no stridor  Voice: notable interval improvements; variable mild to severe strain with occasional breaks primarily on voiced phonemes; tremor on sustained vowel phonation  Head: normocephalic  Eyes: conjunctivae and sclerae clear  Indirect laryngoscopy is limited due to gag      Assessment:     Saida Richard is a 89 y.o. female with  chronic familial spasmodic dysphonia with tremor, against a background of a segmental/cervicofacial dystonia.    She has made great progress following recent laryngeal botulinum toxin injection.     Plan:     Reassurance was provided. She will follow up with me for another injection as needed.    All questions were answered, and the patient is in agreement with the plan.    Raghu Nazario M.D.  [facility]  Department of Otorhinolaryngology and Communication Sciences

## 2024-06-25 ENCOUNTER — TELEPHONE (OUTPATIENT)
Dept: OTOLARYNGOLOGY | Facility: CLINIC | Age: 89
End: 2024-06-25

## 2024-06-25 DIAGNOSIS — J38.3 ADDUCTOR SPASMODIC DYSPHONIA: Primary | ICD-10-CM

## 2024-06-25 DIAGNOSIS — J38.5 LARYNGEAL SPASM: ICD-10-CM

## 2024-06-25 NOTE — TELEPHONE ENCOUNTER
----- Message from Claude Green sent at 6/25/2024 11:11 AM CDT -----  Name of Who is Calling:Liam pt son        What is the request in detail:Pt's son would like a callback from office in regards to clare appt f/u.Please advise thank you       Can the clinic reply by [facility]:NO        [facility]Liam [phone]

## 2024-08-02 ENCOUNTER — PROCEDURE VISIT (OUTPATIENT)
Dept: OTOLARYNGOLOGY | Facility: CLINIC | Age: 89
End: 2024-08-02
Payer: MEDICARE

## 2024-08-02 VITALS — SYSTOLIC BLOOD PRESSURE: 190 MMHG | DIASTOLIC BLOOD PRESSURE: 85 MMHG | HEART RATE: 67 BPM

## 2024-08-02 DIAGNOSIS — R49.8 VOCAL TREMOR: ICD-10-CM

## 2024-08-02 DIAGNOSIS — J38.5 LARYNGEAL SPASM: ICD-10-CM

## 2024-08-02 DIAGNOSIS — J38.3 ADDUCTOR SPASMODIC DYSPHONIA: Primary | ICD-10-CM

## 2024-08-02 RX ORDER — FLUTICASONE PROPIONATE 50 MCG
1 SPRAY, SUSPENSION (ML) NASAL
COMMUNITY

## 2024-08-02 RX ORDER — HYDROCORTISONE 25 MG/G
CREAM TOPICAL NIGHTLY
COMMUNITY
Start: 2024-05-22

## 2024-08-02 NOTE — PROCEDURES
Procedures  [facility]  Department of Otorhinolaryngology and Communication Sciences    Last injection:  09/01/2023, 2.5 units bilateral TAs  Duration of breathiness: 2-3 days  Duration of dysphagia: 0 days  Duration of benefit:  At least 4 months    Preoperative Diagnosis:   1. Adductor spasmodic dypshonia with tremor.  2. Laryngeal spasm.    Postoperative Diagnosis:   1. Adductor spasmodic dypshonia with tremor.  2. Laryngeal spasm.    Procedure: Chemodenervation of larynx, percutaneous, under guidance of needle electromyography, bilateral thyroarytenoid/lateral cricoarytenoid complex.    Toxin serotype used: Botox.    Total units employed:   1. Left TA/LCA - 2.5 units.  2. Right TA/LCA - 2.5 units.  3.  45 units wasted.    Surgeon: Raghu Nazario M.D.     Estimated blood loss: None.    Anesthesia: Local.     Complications: None.    Procedure in detail: Saida Richard was positively identified with two identifiers and was seated upright in a comfortable position. Final time-out was performed for verification purposes. Local cutaneous and subcutaneous anesthesia was not necessary. Surface electrodes were connected. Botulinum toxin was reconstituted with sterile normal saline at a concentration of 25 units/mL. A 1 mL tuberculin syringe pre-loaded with botulinum toxin was connected to a 37 mm 26-gauge injectable needle electrode. The needle was advanced percutaneously into the targeted muscle groups. Appropriate insertional activity and recruitment were noted based on visual and auditory feedback of electromyography. Under electromyographic guidance, botulinum toxin was administered, with the quantity of toxin used and muscle groups targeted outlined above. The equipment was removed. The patient tolerated the procedure well without complication. All needle counts were correct at the completion of the procedure. The patient was observed briefly before being discharged home in good condition.    Raghu PHOENIX  AURELIO Nazario.  [facility]  Department of Otorhinolaryngology and Communication Sciences

## 2024-08-13 ENCOUNTER — OFFICE VISIT (OUTPATIENT)
Dept: OTOLARYNGOLOGY | Facility: CLINIC | Age: 89
End: 2024-08-13
Payer: MEDICARE

## 2024-08-13 DIAGNOSIS — Z97.4 WEARS HEARING AID IN BOTH EARS: ICD-10-CM

## 2024-08-13 DIAGNOSIS — H61.21 EXCESSIVE CERUMEN IN EAR CANAL, RIGHT: Primary | ICD-10-CM

## 2024-08-13 PROCEDURE — 1101F PT FALLS ASSESS-DOCD LE1/YR: CPT | Mod: CPTII,S$GLB,, | Performed by: NURSE PRACTITIONER

## 2024-08-13 PROCEDURE — 1159F MED LIST DOCD IN RCRD: CPT | Mod: CPTII,S$GLB,, | Performed by: NURSE PRACTITIONER

## 2024-08-13 PROCEDURE — 99999 PR PBB SHADOW E&M-EST. PATIENT-LVL II: CPT | Mod: PBBFAC,,, | Performed by: NURSE PRACTITIONER

## 2024-08-13 PROCEDURE — 1126F AMNT PAIN NOTED NONE PRSNT: CPT | Mod: CPTII,S$GLB,, | Performed by: NURSE PRACTITIONER

## 2024-08-13 PROCEDURE — 99213 OFFICE O/P EST LOW 20 MIN: CPT | Mod: 25,S$GLB,, | Performed by: NURSE PRACTITIONER

## 2024-08-13 PROCEDURE — 3288F FALL RISK ASSESSMENT DOCD: CPT | Mod: CPTII,S$GLB,, | Performed by: NURSE PRACTITIONER

## 2024-08-13 NOTE — PROGRESS NOTES
Subjective:   Saida Richard is a 89 y.o. female presents for ear fullness. She is here with her son. She was referred by her audiologist for an ear cleaning. She has been wearing hearing aids for the past 20+ years. She reports some ear itching, but denies any otalgia, otorrhea, ear fullness/pressure, tinnitus or vertigo. There is not a family history of hearing loss at a young age. There is not a prior history of ear surgery. There is not a prior history of ear infections. There is not a history of ear trauma. She denies a history of significant noise exposure.     Past Medical History  She has a past medical history of CA - breast cancer, Hypertension, Pollen allergies, Spasmodic dysphonia, and Thyroid disease.    Past Surgical History  She has a past surgical history that includes Hernia repair.    Family History  Her family history is not on file.    Social History  She reports that she has never smoked. She has never used smokeless tobacco. She reports that she does not drink alcohol and does not use drugs.    Allergies  She has No Known Allergies.    Medications  She has a current medication list which includes the following prescription(s): erythromycin, famotidine, fluticasone propionate, hydrocortisone, levothyroxine, and propranolol.  Review of Systems     Constitutional: Negative for chills and fever.      HENT: Positive for hearing loss.  Negative for ear discharge, ear infection, ear pain and ringing in the ears.      Neurological: Negative for dizziness.          Objective:     Constitutional:   She is oriented to person, place, and time. She appears well-developed and well-nourished. She appears alert. She is cooperative.  Non-toxic appearance. She does not have a sickly appearance. She does not appear ill. Normal speech.      Head:  Normocephalic and atraumatic. Not macrocephalic and not microcephalic. Head is without abrasion, without right periorbital erythema, without left periorbital erythema and  without TMJ tenderness.     Ears:    Right Ear: No drainage, swelling or tenderness. No mastoid tenderness. Tympanic membrane is not scarred, not perforated, not erythematous, not retracted and not bulging. No middle ear effusion.   Left Ear: No drainage, swelling or tenderness. No mastoid tenderness. Tympanic membrane is not scarred, not perforated, not erythematous, not retracted and not bulging.  No middle ear effusion.   Ceruminous debris obstructing right TM removed under microscopy    Pulmonary/Chest:   Effort normal.     Psychiatric:   She has a normal mood and affect. Her speech is normal and behavior is normal.     Neurological:   She is alert and oriented to person, place, and time. Gait (uses cane) abnormal.     Procedure  Cerumen removal performed.  See procedure note.  Procedure Note:  The patient was brought to the minor procedure room and placed under the operating microscope of the right ear canal which was cleaned of ceruminous debris. Using a combination of suction, curettes and cup forceps the patient's cerumen was removed. The patient tolerated the procedure well. There were no complications.    Assessment:     1. Excessive cerumen in ear canal, right    2. Wears hearing aid in both ears      Plan:     Excessive cerumen in ear canal, right  Right cerumen impaction removed under microscopy. Patient tolerated procedure well and noted improvement upon impaction removal. Education about normal ear hygiene and the avoidance of Q-tips was reviewed.     RTC every year for regular ear cleanings.     Wears hearing aid in both ears  Follow-up with outside audiologist prn

## 2025-08-18 ENCOUNTER — OFFICE VISIT (OUTPATIENT)
Dept: URGENT CARE | Facility: CLINIC | Age: OVER 89
End: 2025-08-18
Payer: MEDICARE

## 2025-08-18 VITALS
DIASTOLIC BLOOD PRESSURE: 90 MMHG | SYSTOLIC BLOOD PRESSURE: 160 MMHG | HEIGHT: 63 IN | WEIGHT: 120 LBS | OXYGEN SATURATION: 96 % | TEMPERATURE: 98 F | HEART RATE: 77 BPM | RESPIRATION RATE: 20 BRPM | BODY MASS INDEX: 21.26 KG/M2

## 2025-08-18 DIAGNOSIS — S62.307A CLOSED FRACTURE OF FIFTH METACARPAL BONE OF LEFT HAND, UNSPECIFIED FRACTURE MORPHOLOGY, INITIAL ENCOUNTER: Primary | ICD-10-CM

## 2025-08-18 DIAGNOSIS — S69.92XA INJURY OF LEFT WRIST, INITIAL ENCOUNTER: ICD-10-CM

## 2025-08-18 DIAGNOSIS — T14.8XXA ABRASION OF SKIN: ICD-10-CM

## 2025-08-18 PROCEDURE — 73110 X-RAY EXAM OF WRIST: CPT | Mod: LT,S$GLB,, | Performed by: RADIOLOGY

## 2025-08-18 PROCEDURE — 99214 OFFICE O/P EST MOD 30 MIN: CPT | Mod: S$GLB,,, | Performed by: NURSE PRACTITIONER

## 2025-08-18 RX ORDER — MUPIROCIN 20 MG/G
OINTMENT TOPICAL 2 TIMES DAILY
Qty: 30 G | Refills: 0 | Status: SHIPPED | OUTPATIENT
Start: 2025-08-18 | End: 2025-08-23